# Patient Record
Sex: FEMALE
[De-identification: names, ages, dates, MRNs, and addresses within clinical notes are randomized per-mention and may not be internally consistent; named-entity substitution may affect disease eponyms.]

---

## 2020-11-12 ENCOUNTER — NURSE TRIAGE (OUTPATIENT)
Dept: OTHER | Facility: CLINIC | Age: 35
End: 2020-11-12

## 2020-11-12 NOTE — TELEPHONE ENCOUNTER
Reason for Disposition   Continuous (nonstop) coughing interferes with work or school and no improvement using cough treatment per Care Advice    Answer Assessment - Initial Assessment Questions  Patient diagnosied with Covid 19 on 11/3/2020    1. ONSET: \"When did the cough begin? \"       11/3/2020  2. SEVERITY: \"How bad is the cough today? \"       Deep cough, worse when she lies down  3. RESPIRATORY DISTRESS: \"Describe your breathing. \"     Chest tighttness also, has improved  4. FEVER: \"Do you have a fever? \" If so, ask: \"What is your temperature, how was it measured, and when did it start? \"      no  5. HEMOPTYSIS: \"Are you coughing up any blood? \" If so ask: \"How much? \" (flecks, streaks, tablespoons, etc.)      no  6. TREATMENT: \"What have you done so far to treat the cough? \" (e.g., meds, fluids, humidifier)      mucinex DM  7. CARDIAC HISTORY: \"Do you have any history of heart disease? \" (e.g., heart attack, congestive heart failure)       no  8. LUNG HISTORY: \"Do you have any history of lung disease? \"  (e.g., pulmonary embolus, asthma, emphysema)     no  9. PE RISK FACTORS: \"Do you have a history of blood clots? \" (or: recent major surgery, recent prolonged travel, bedridden)      no  10. OTHER SYMPTOMS: \"Do you have any other symptoms? (e.g., runny nose, wheezing, chest pain)        Chest tightness  11. PREGNANCY: \"Is there any chance you are pregnant? \" \"When was your last menstrual period? \"       no    Protocols used: COUGH-ADULT-OH    Pt agrees with discharge disposition.  Care advice given

## 2023-12-29 ENCOUNTER — TELEMEDICINE (OUTPATIENT)
Dept: PRIMARY CARE | Facility: CLINIC | Age: 38
End: 2023-12-29
Payer: COMMERCIAL

## 2023-12-29 DIAGNOSIS — J40 BRONCHITIS: Primary | ICD-10-CM

## 2023-12-29 PROCEDURE — 99213 OFFICE O/P EST LOW 20 MIN: CPT | Performed by: FAMILY MEDICINE

## 2023-12-29 RX ORDER — BENZONATATE 100 MG/1
100 CAPSULE ORAL 3 TIMES DAILY PRN
Qty: 42 CAPSULE | Refills: 0 | Status: SHIPPED | OUTPATIENT
Start: 2023-12-29 | End: 2024-01-28

## 2023-12-29 RX ORDER — PREDNISONE 20 MG/1
40 TABLET ORAL DAILY
Qty: 10 TABLET | Refills: 0 | Status: SHIPPED | OUTPATIENT
Start: 2023-12-29 | End: 2024-01-03

## 2023-12-29 RX ORDER — MOXIFLOXACIN HYDROCHLORIDE 400 MG/1
400 TABLET ORAL DAILY
Qty: 10 TABLET | Refills: 0 | Status: SHIPPED | OUTPATIENT
Start: 2023-12-29 | End: 2024-01-08

## 2023-12-29 NOTE — PROGRESS NOTES
Chief Complaint:  No chief complaint on file.  History Of Present Illness:   Danyell Bishop is a 38 y.o. female presenting for a virtual visit.      She has bronchitis type symptoms.  Cough seems to be the worst symptom that she currently has.  She is traveling soon with her family.    Onset- started Dec 23, 2023.   Palliative- steam in shower  Provocation- laying down, going up and down stairs  Location: all in her chest.   Severity- moderate to severe.   Treatment- OTC meds, benadryl           PMH: GERD, orthopedic injuries.      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          Flu shot:  - got one in 2023.      Immunizations:   - utd per pt.                             Review of Systems:     Negative  Constitutional:   - night sweats  - unexpected weight change       Eyes:   - loss of vision  - double vision  - floaters       Cardiovascular:   - chest heaviness  - palpitations  - swelling in ankles            Neurological:   - loss of consciousness  - tremors  - dizzy spells  - numbness   - tingling     Gastrointestinal:   - abdominal pain  - nausea  - vomiting  - constipation  - diarrhea  - bloody stools  - loss of bowel control  - indigestion  - heartburn  - sour taste in throat when waking up     Genitourinary:   - urinary incontinence  - increased urinary frequency  - painful urination  - blood in urine     Skin:   - Rash  - lumps or bumps  - worrisome moles       Hematologic/Lymphatic:   - swollen glands  - blood clotting problems  - easy bruising.     Psychological:   - feelings of depression  - feelings of anxiety.          Positive  Psychological:   - feeling generally happy  - feeling safe at home            Last Recorded Vitals:  There were no vitals filed for this visit.     Past Medical History:  She has no past medical history on file.     Past Surgical History:  She has a past surgical history that includes  "Knee surgery (06/13/2018).     Social History:  She reports that she has never smoked. She has never used smokeless tobacco. No history on file for alcohol use and drug use.     Family History:  No family history on file.  Allergies:  Patient has no allergy information on record.     Outpatient Medications:  No current outpatient medications     Physical Exam:  GENERAL: Well developed, well nourished, alert and cooperative, and appears to be in no acute distress.     PSYCH: mood pleasant and appropriate     HEAD: normocephalic         LUNGS: Good respiratory effort.         Last Labs:  CBC -  No results found for: \"WBC\", \"HGB\", \"HCT\", \"MCV\", \"PLT\"     CMP -  No results found for: \"CALCIUM\", \"PHOS\", \"PROT\", \"ALBUMIN\", \"AST\", \"ALT\", \"ALKPHOS\", \"BILITOT\"     LIPID PANEL -  No results found for: \"CHOL\", \"TRIG\", \"HDL\", \"CHHDL\", \"LDLF\", \"VLDL\", \"NHDL\"        No results found for: \"BNP\", \"HGBA1C\"     Assessment/Plan   Problem List Items Addressed This Visit             ICD-10-CM    Bronchitis - Primary J40      Virtual or Telephone Consent    An interactive audio and video telecommunication system which permits real time communications between the patient (at the originating site) and provider (at the distant site) was utilized to provide this telehealth service.   Verbal consent was requested and obtained from Danyell Bishop on this date, 12/29/23 for a telehealth visit.      Virtual visit.  Likely bronchitis.  Agreed on strong antibiotic as well as steroids and cough medicine.  Continue taking Mucinex as needed.  Let me know if the medication is too expensive and I can call in an alternative.    Let the office know if you are not better by next week.     Dominic Almanza, DO  "

## 2025-09-18 ENCOUNTER — APPOINTMENT (OUTPATIENT)
Dept: AUDIOLOGY | Facility: CLINIC | Age: 40
End: 2025-09-18
Payer: COMMERCIAL

## 2025-09-18 ENCOUNTER — APPOINTMENT (OUTPATIENT)
Dept: OTOLARYNGOLOGY | Facility: CLINIC | Age: 40
End: 2025-09-18
Payer: COMMERCIAL